# Patient Record
Sex: FEMALE | Race: WHITE | URBAN - METROPOLITAN AREA
[De-identification: names, ages, dates, MRNs, and addresses within clinical notes are randomized per-mention and may not be internally consistent; named-entity substitution may affect disease eponyms.]

---

## 2023-03-09 ENCOUNTER — TELEPHONE (OUTPATIENT)
Dept: ENDOCRINOLOGY CLINIC | Facility: CLINIC | Age: 23
End: 2023-03-09

## 2023-03-10 NOTE — TELEPHONE ENCOUNTER
Per request from patient's psychiatrist, please call to book apt for thyroid consult at 2 pm on March 15th   Thanks

## 2023-03-15 ENCOUNTER — OFFICE VISIT (OUTPATIENT)
Dept: ENDOCRINOLOGY CLINIC | Facility: CLINIC | Age: 23
End: 2023-03-15

## 2023-03-15 VITALS — DIASTOLIC BLOOD PRESSURE: 81 MMHG | HEART RATE: 92 BPM | WEIGHT: 219.63 LBS | SYSTOLIC BLOOD PRESSURE: 113 MMHG

## 2023-03-15 DIAGNOSIS — R79.89 HIGH SERUM THYROID STIMULATING HORMONE (TSH): Primary | ICD-10-CM

## 2023-03-15 DIAGNOSIS — E55.9 VITAMIN D DEFICIENCY: ICD-10-CM

## 2023-03-15 DIAGNOSIS — R22.1 NECK FULLNESS: ICD-10-CM

## 2023-03-15 DIAGNOSIS — Z80.8 FAMILY HISTORY OF THYROID CANCER: ICD-10-CM

## 2023-03-15 PROCEDURE — 99203 OFFICE O/P NEW LOW 30 MIN: CPT | Performed by: INTERNAL MEDICINE

## 2023-03-15 PROCEDURE — 3079F DIAST BP 80-89 MM HG: CPT | Performed by: INTERNAL MEDICINE

## 2023-03-15 PROCEDURE — 3074F SYST BP LT 130 MM HG: CPT | Performed by: INTERNAL MEDICINE

## 2023-03-15 NOTE — H&P
New Patient Evaluation - History and Physical    CONSULT - Reason for Visit:  High TSH  Requesting Physician:  Self referral    CHIEF COMPLAINT:  Patient presents with:  Consult: Per pt is in to establish care for Thyroid. Per pt states family history of Thyroid. HISTORY OF PRESENT ILLNESS:   Brian Bethea is a 26year old female who presents for evaluation of thyroid function   Patient has a strong FH of thyroid disease. Aunt had thyroid cancer, mother had thyroid nodules and underwent thyroidectomy and is on L4 now. Patient was noted to have a one time elevation in TSH , repeat normal  She reports fatigue, increased sleepiness and hair loss from scalp     She denies any compressive symptoms, but reports some neck fullness      PAST MEDICAL HISTORY:   History reviewed. No pertinent past medical history. PAST SURGICAL HISTORY:   History reviewed. No pertinent surgical history. CURRENT MEDICATIONS:    No current outpatient medications on file. ALLERGIES:  No Known Allergies    SOCIAL HISTORY:    Social History    Socioeconomic History      Marital status: Single      FAMILY HISTORY:   History reviewed. No pertinent family history.     ASSESSMENTS:     REVIEW OF SYSTEMS:  Constitutional: Negative for: weight change, fever, +  fatigue, cold/heat intolerance  Eyes: Negative for:  Visual changes, proptosis, blurring  ENT: Negative for:  dysphagia, dysphonia, + neck fullness  Respiratory: Negative for:  dyspnea, cough  Cardiovascular: Negative for:  chest pain, palpitations, orthopnea  GI: Negative for:  abdominal pain, nausea, vomiting, diarrhea, constipation, bleeding  Neurology: Negative for: headache, numbness, weakness  Genito-Urinary: Negative for: dysuria, frequency  Psychiatric: Negative for:  depression, anxiety  Hematology/Lymphatics: Negative for: bruising, lower extremity edema  Endocrine: Negative for: polyuria, polydypsia  Skin: Negative for: rash, blister, cellulitis, + hair loss from scalp      PHYSICAL EXAM:    03/15/23  1412   BP: 113/81   Pulse: 92   Weight: 219 lb 9.6 oz (99.6 kg)     BMI: There is no height or weight on file to calculate BMI. General Appearance:  alert, well developed, in no acute distress  Head: Atraumatic  Eyes:  normal conjunctivae, sclera. , normal sclera and normal pupils  Throat/Neck: normal sound to voice. Normal hearing, normal speech, no significant thyroid enlargement  Respiratory:  Speaking in full sentences, non-labored. no increased work of breathing, no audible wheezing    Skin:  normal moisture and skin texture, no visible lesions  Hair and nails: normal scalp hair  Hematologic:  no excessive bruising  Neuro: motor grossly intact, moving all extremities without difficulty  Psychiatric:  oriented to time, self, and place  Extremities: no obvious extremity swelling, no lesions        DATA:     Pertinent data reviewed      ASSESSMENT AND PLAN:    Patient is a 26year old female with :   1. FH of thyroid cancer, some neck fullness  2 TSH elevation x 1, repeat normal, TPO AB not elevated  I reviewed the following:   - Thyroid gland structure and function  - Thyroid axis  - Interpretation of thyroid labs  - Symptoms and management of hypothyroidism    PLAN:   Since repeat TSH is normal, discussed that her symptoms might/ might not be related to her thyroid  Recommend repeating TSH in April 2023. If TSH is persistently elevated, given symptoms, can start LT4  Will check a vitamin D level also    Will get a thyroid US    Further management will be based on results    Patient verbalized a complete  understanding of all of the above and did not have any further questions. Orders Placed This Encounter      TSH W Reflex To Free T4      Vitamin D, 25-Hydroxy [Q]        3/15/2023  Lili Morales MD        Patient verbalized a complete  understanding of all of the above and did not have any further questions.

## 2023-05-17 ENCOUNTER — LAB ENCOUNTER (OUTPATIENT)
Dept: LAB | Facility: HOSPITAL | Age: 23
End: 2023-05-17
Attending: INTERNAL MEDICINE
Payer: COMMERCIAL

## 2023-05-17 ENCOUNTER — HOSPITAL ENCOUNTER (OUTPATIENT)
Dept: ULTRASOUND IMAGING | Facility: HOSPITAL | Age: 23
Discharge: HOME | End: 2023-05-17
Attending: INTERNAL MEDICINE
Payer: COMMERCIAL

## 2023-05-17 DIAGNOSIS — Z80.8 FAMILY HISTORY OF THYROID CANCER: ICD-10-CM

## 2023-05-17 DIAGNOSIS — E55.9 VITAMIN D DEFICIENCY: ICD-10-CM

## 2023-05-17 DIAGNOSIS — R22.1 NECK FULLNESS: ICD-10-CM

## 2023-05-17 DIAGNOSIS — R79.89 HIGH SERUM THYROID STIMULATING HORMONE (TSH): ICD-10-CM

## 2023-05-17 LAB
TSI SER-ACNC: 0.93 MIU/ML (ref 0.36–3.74)
VIT D+METAB SERPL-MCNC: 26 NG/ML (ref 30–100)

## 2023-05-17 PROCEDURE — 84443 ASSAY THYROID STIM HORMONE: CPT

## 2023-05-17 PROCEDURE — 76536 US EXAM OF HEAD AND NECK: CPT | Performed by: INTERNAL MEDICINE

## 2023-05-17 PROCEDURE — 36415 COLL VENOUS BLD VENIPUNCTURE: CPT

## 2023-05-17 PROCEDURE — 82306 VITAMIN D 25 HYDROXY: CPT

## 2023-05-18 DIAGNOSIS — E55.9 VITAMIN D INSUFFICIENCY: Primary | ICD-10-CM

## 2023-05-18 RX ORDER — ERGOCALCIFEROL 1.25 MG/1
50000 CAPSULE ORAL WEEKLY
Qty: 4 CAPSULE | Refills: 0 | Status: SHIPPED | OUTPATIENT
Start: 2023-05-18 | End: 2023-05-22

## 2023-05-22 NOTE — TELEPHONE ENCOUNTER
ergocalciferol 1.25 MG (12588 UT) Oral Cap, Take 1 capsule (50,000 Units total) by mouth once a week for 4 days. , Disp: 4 capsule, Rfl: 0

## 2023-05-24 RX ORDER — ERGOCALCIFEROL 1.25 MG/1
50000 CAPSULE ORAL WEEKLY
Qty: 4 CAPSULE | Refills: 0 | Status: SHIPPED | OUTPATIENT
Start: 2023-05-24 | End: 2023-06-15

## 2024-08-12 ENCOUNTER — APPOINTMENT (OUTPATIENT)
Dept: LAB | Age: 24
End: 2024-08-12